# Patient Record
Sex: FEMALE
[De-identification: names, ages, dates, MRNs, and addresses within clinical notes are randomized per-mention and may not be internally consistent; named-entity substitution may affect disease eponyms.]

---

## 2018-09-24 ENCOUNTER — RESULT REVIEW (OUTPATIENT)
Age: 58
End: 2018-09-24

## 2019-09-16 ENCOUNTER — RESULT REVIEW (OUTPATIENT)
Age: 59
End: 2019-09-16

## 2020-09-18 ENCOUNTER — RESULT REVIEW (OUTPATIENT)
Age: 60
End: 2020-09-18

## 2020-10-07 ENCOUNTER — APPOINTMENT (OUTPATIENT)
Dept: UROLOGY | Facility: CLINIC | Age: 60
End: 2020-10-07
Payer: COMMERCIAL

## 2020-10-07 VITALS
TEMPERATURE: 97.5 F | WEIGHT: 132 LBS | HEIGHT: 64 IN | HEART RATE: 94 BPM | DIASTOLIC BLOOD PRESSURE: 85 MMHG | BODY MASS INDEX: 22.53 KG/M2 | SYSTOLIC BLOOD PRESSURE: 129 MMHG

## 2020-10-07 DIAGNOSIS — Z00.00 ENCOUNTER FOR GENERAL ADULT MEDICAL EXAMINATION W/OUT ABNORMAL FINDINGS: ICD-10-CM

## 2020-10-07 PROCEDURE — 51798 US URINE CAPACITY MEASURE: CPT

## 2020-10-07 PROCEDURE — 99204 OFFICE O/P NEW MOD 45 MIN: CPT | Mod: 25

## 2020-10-07 PROCEDURE — 81003 URINALYSIS AUTO W/O SCOPE: CPT | Mod: QW

## 2020-10-07 NOTE — HISTORY OF PRESENT ILLNESS
[FreeTextEntry1] : 60 year old  female with history of microscopic hematuria.  She has been sent her by her gyn for evaluation.  \par \par Ms. Mills also has intermittent right epigastric pain for the last year.  Underwent a CT abd/pelvis in 2019 which was unremarkable from a  perspective.  A small pancreatic cyst was noted.  She underwent a upper endoscopy and colonoscopy both of which were negative.  The etiology of her abdominal pain is still unclear.  \par \par Pt also reports intermittent urinary frequency and urgency as well as nocturia x3.   Denies dysuria. \par \par PVR min \par \par PMH: no\par PSH: breast lumpectomy, breast lift, umb hernia\par SH:  no tob, no EtOH\par FH:  daughter with stones,  malignancy-CaP in cousins. \par

## 2020-10-07 NOTE — PHYSICAL EXAM

## 2020-10-07 NOTE — LETTER BODY
[Dear  ___] : Dear  [unfilled], [Consult Letter:] : I had the pleasure of evaluating your patient, [unfilled]. [Please see my note below.] : Please see my note below. [Consult Closing:] : Thank you very much for allowing me to participate in the care of this patient.  If you have any questions, please do not hesitate to contact me. [Sincerely,] : Sincerely, [FreeTextEntry3] : Dr. Lana Piper\par

## 2020-10-07 NOTE — ASSESSMENT
[FreeTextEntry1] : Pt is a 60 year old female with microscopic hematuria.  I have sent today's urine for culture and cytology and she will undergo a CT scan and cystoscopy.

## 2020-10-12 LAB
BACTERIA UR CULT: NORMAL
BILIRUB UR QL STRIP: NORMAL
CLARITY UR: CLEAR
COLLECTION METHOD: NORMAL
GLUCOSE UR-MCNC: NORMAL
HCG UR QL: 0.2 EU/DL
KETONES UR-MCNC: NORMAL
LEUKOCYTE ESTERASE UR QL STRIP: NORMAL
NITRITE UR QL STRIP: NORMAL
PH UR STRIP: NORMAL
PROT UR STRIP-MCNC: 6
SP GR UR STRIP: >=1.03
URINE CYTOLOGY: NORMAL

## 2020-10-21 ENCOUNTER — APPOINTMENT (OUTPATIENT)
Dept: UROLOGY | Facility: CLINIC | Age: 60
End: 2020-10-21
Payer: COMMERCIAL

## 2020-10-21 VITALS
OXYGEN SATURATION: 100 % | TEMPERATURE: 97.3 F | HEART RATE: 91 BPM | SYSTOLIC BLOOD PRESSURE: 135 MMHG | DIASTOLIC BLOOD PRESSURE: 89 MMHG

## 2020-10-21 LAB
BILIRUB UR QL STRIP: NORMAL
CLARITY UR: CLEAR
COLLECTION METHOD: NORMAL
GLUCOSE UR-MCNC: NORMAL
HCG UR QL: 0.2 EU/DL
HGB UR QL STRIP.AUTO: NORMAL
KETONES UR-MCNC: NORMAL
LEUKOCYTE ESTERASE UR QL STRIP: NORMAL
NITRITE UR QL STRIP: NORMAL
PH UR STRIP: 7
PROT UR STRIP-MCNC: NORMAL
SP GR UR STRIP: 1.02

## 2020-10-21 PROCEDURE — 52000 CYSTOURETHROSCOPY: CPT

## 2020-10-21 PROCEDURE — 81003 URINALYSIS AUTO W/O SCOPE: CPT | Mod: QW

## 2020-10-21 PROCEDURE — 99072 ADDL SUPL MATRL&STAF TM PHE: CPT

## 2020-10-21 PROCEDURE — 99214 OFFICE O/P EST MOD 30 MIN: CPT | Mod: 25

## 2020-10-21 NOTE — ASSESSMENT
[FreeTextEntry1] : Pt is a 60 year old female with microscopic hematuria. Patient underwent cystoscopy today which was normal and she tolerated the procedure well.  We discussed the results of her CT which demonstrated small bilateral renal cysts. Pt will follow up in 1 year for repeat urine testing.

## 2020-10-21 NOTE — HISTORY OF PRESENT ILLNESS
[FreeTextEntry1] : 60 year old  female with history of microscopic hematuria.  She has been sent her by her gyn for evaluation.  She presents today for cystoscopy. \par \par Ms. Mills also has intermittent right epigastric pain for the last year.  Underwent a CT abd/pelvis in 2019 which was unremarkable from a  perspective.  A small pancreatic cyst was noted.  She underwent a upper endoscopy and colonoscopy both of which were negative.  The etiology of her abdominal pain is still unclear. Recent CT 10/14/20 demonstrated stable simple renal cysts. \par \par Pt also reports intermittent urinary frequency and urgency as well as nocturia x3.   Denies dysuria. \par \par Urine cytology and urine culture negative 10/07/2020. \par \par PMH: no\par PSH: breast lumpectomy, breast lift, umb hernia\par SH:  no tob, no EtOH\par FH:  daughter with stones,  malignancy-CaP in cousins. \par 
Martin Chen

## 2020-10-21 NOTE — PHYSICAL EXAM

## 2020-10-26 LAB — BACTERIA UR CULT: NORMAL

## 2021-09-21 ENCOUNTER — RESULT REVIEW (OUTPATIENT)
Age: 61
End: 2021-09-21

## 2022-09-22 ENCOUNTER — RESULT REVIEW (OUTPATIENT)
Age: 62
End: 2022-09-22

## 2023-07-12 ENCOUNTER — APPOINTMENT (OUTPATIENT)
Dept: UROLOGY | Facility: CLINIC | Age: 63
End: 2023-07-12
Payer: COMMERCIAL

## 2023-07-12 VITALS
OXYGEN SATURATION: 99 % | SYSTOLIC BLOOD PRESSURE: 145 MMHG | HEART RATE: 91 BPM | DIASTOLIC BLOOD PRESSURE: 80 MMHG | TEMPERATURE: 98 F

## 2023-07-12 DIAGNOSIS — R31.29 OTHER MICROSCOPIC HEMATURIA: ICD-10-CM

## 2023-07-12 DIAGNOSIS — R82.90 UNSPECIFIED ABNORMAL FINDINGS IN URINE: ICD-10-CM

## 2023-07-12 PROCEDURE — 81003 URINALYSIS AUTO W/O SCOPE: CPT | Mod: QW

## 2023-07-12 PROCEDURE — 99214 OFFICE O/P EST MOD 30 MIN: CPT

## 2023-07-13 NOTE — ASSESSMENT
[FreeTextEntry1] : I discussed the findings and options with Ms. YING SEUGRA in detail.\par \par We discussed the etiology and implications of recurrent asymptomatic microhematuria in great detail. Ms. SEGURA understands that the standard evaluation for microscopic hematuria includes a CT Scan of the abdomen and pelvis (+/-) followed by an in-office cystoscopy. Both procedures were discussed in great detail with their risks and benefits. \par \par A renal ultrasound was ordered today in the setting of hx microscopic hematuria.\par \par Urine was sent for analysis. \par \par She will call the office on Monday to discuss urine results.  If she has >3RBCs per hpf she will return for a cystoscopy prior to her relocation to [place]. \par \par Patient expressed understanding. \par \par

## 2023-07-13 NOTE — HISTORY OF PRESENT ILLNESS
[FreeTextEntry1] : Ms. YING SEGURA comes in today for her Urologic follow up. Pt is a 61 yo F with hx of microscopic hematuria. She presents today for a f/u visit.   She has no new complaints.  She has long-standing right lower abdominal pain of unclear etiology- all work-up remains negative.  The symptoms appear to be GI related-"improves with eating". \par \par She will be retiring end of August--plans on moving to [place]\par \par Udip: 2023 ---mod blood, trace leuks\par \par \par 10/21/2020--- 60 year old  female with history of microscopic hematuria.  She has been sent her by her gyn for evaluation.  She presents today for cystoscopy. \par \par Ms. Segura also has intermittent right epigastric pain for the last year.  Underwent a CT abd/pelvis in 2019 which was unremarkable from a  perspective.  A small pancreatic cyst was noted.  She underwent a upper endoscopy and colonoscopy both of which were negative.  The etiology of her abdominal pain is still unclear. Recent CT 10/14/20 demonstrated stable simple renal cysts. \par \par Pt also reports intermittent urinary frequency and urgency as well as nocturia x3.   Denies dysuria. \par \par Urine cytology and urine culture negative 10/07/2020. \par \par PMH: no\par PSH: breast lumpectomy, breast lift, umb hernia\par SH:  no tob, no EtOH\par FH:  daughter with stones,  malignancy-CaP in cousins. \par

## 2023-07-13 NOTE — ADDENDUM
[FreeTextEntry1] : A portion of this note was written by [Eliot Soares] on 07/12/2023 acting as a scribe for Dr. Piper. \par \par I have personally reviewed the chart and agree that the record accurately reflects my personal performance of the history, physical exam, assessment, and plan.\par

## 2023-07-14 LAB
APPEARANCE: CLEAR
BACTERIA UR CULT: NORMAL
BACTERIA: NEGATIVE /HPF
BILIRUBIN URINE: NEGATIVE
BLOOD URINE: ABNORMAL
CAST: 0 /LPF
COLOR: YELLOW
EPITHELIAL CELLS: 0 /HPF
GLUCOSE QUALITATIVE U: NEGATIVE MG/DL
KETONES URINE: NEGATIVE MG/DL
LEUKOCYTE ESTERASE URINE: ABNORMAL
MICROSCOPIC-UA: NORMAL
NITRITE URINE: NEGATIVE
PH URINE: 7
PROTEIN URINE: NORMAL MG/DL
RED BLOOD CELLS URINE: 11 /HPF
SPECIFIC GRAVITY URINE: 1.02
UROBILINOGEN URINE: 1 MG/DL
WHITE BLOOD CELLS URINE: 3 /HPF

## 2023-08-03 ENCOUNTER — APPOINTMENT (OUTPATIENT)
Dept: UROLOGY | Facility: CLINIC | Age: 63
End: 2023-08-03
Payer: COMMERCIAL

## 2023-08-03 VITALS
DIASTOLIC BLOOD PRESSURE: 83 MMHG | HEART RATE: 85 BPM | OXYGEN SATURATION: 100 % | TEMPERATURE: 97.6 F | SYSTOLIC BLOOD PRESSURE: 145 MMHG

## 2023-08-03 DIAGNOSIS — N32.89 OTHER SPECIFIED DISORDERS OF BLADDER: ICD-10-CM

## 2023-08-03 LAB
BILIRUB UR QL STRIP: NEGATIVE
CLARITY UR: CLEAR
COLLECTION METHOD: NORMAL
GLUCOSE UR-MCNC: NEGATIVE
HCG UR QL: 0.2 EU/DL
HGB UR QL STRIP.AUTO: NORMAL
KETONES UR-MCNC: NEGATIVE
LEUKOCYTE ESTERASE UR QL STRIP: NEGATIVE
NITRITE UR QL STRIP: NEGATIVE
PH UR STRIP: 7
PROT UR STRIP-MCNC: NEGATIVE
SP GR UR STRIP: 1.02

## 2023-08-03 PROCEDURE — 52000 CYSTOURETHROSCOPY: CPT

## 2023-08-03 PROCEDURE — 81002 URINALYSIS NONAUTO W/O SCOPE: CPT

## 2023-08-03 RX ORDER — METHENAMINE, SODIUM PHOSPHATE, MONOBASIC, MONOHYDRATE, PHENYL SALICYLATE, METHYLENE BLUE, AND HYOSCYAMINE SULFATE 118; 40.8; 36; 10; .12 MG/1; MG/1; MG/1; MG/1; MG/1
118 CAPSULE ORAL 4 TIMES DAILY
Qty: 16 | Refills: 6 | Status: ACTIVE | COMMUNITY
Start: 2023-08-03 | End: 1900-01-01

## 2023-08-03 NOTE — PHYSICAL EXAM

## 2023-08-03 NOTE — ASSESSMENT
[FreeTextEntry1] : I discussed the findings and options with Ms. YING SEGURA in detail.  Ms. YING SEGURA comes in today for her cystoscopy procedure as discussed during last visit.  Fortunately, today's cystoscopy was negative for any significant lower urinary tract pathology. This also completes her microscopic hematuria evaluation.  We discussed her Renal US from 7/27/23 which were unremarkable--no evidence of renal stones, no hydronephrosis. It showed a simple bilateral renal cyst (benign)--findings suggest no further workup.  Today she c/o occasional bladder spasms. She will take Uribel to suppress her intermittent symptoms. I have sent Rx to her pharmacy.   Patient expressed understanding.

## 2023-08-03 NOTE — HISTORY OF PRESENT ILLNESS
[FreeTextEntry1] : Ms. YING SEGURA comes in today for her Urologic follow up. Including a scheduled cystoscopy to evaluate her history of microscopic hematuria.   23--- Ms. YING SEGURA comes in today for her Urologic follow up. Pt is a 63 yo F with hx of microscopic hematuria. She presents today for a f/u visit.   She has no new complaints.  She has long-standing right lower abdominal pain of unclear etiology- all work-up remains negative.  The symptoms appear to be GI related-"improves with eating".   She will be retiring end of August--plans on moving to [place]  Udip: 2023 ---mod blood, trace leuks   10/21/2020--- 60 year old  female with history of microscopic hematuria.  She has been sent her by her gyn for evaluation.  She presents today for cystoscopy.   Ms. Segura also has intermittent right epigastric pain for the last year.  Underwent a CT abd/pelvis in 2019 which was unremarkable from a  perspective.  A small pancreatic cyst was noted.  She underwent a upper endoscopy and colonoscopy both of which were negative.  The etiology of her abdominal pain is still unclear. Recent CT 10/14/20 demonstrated stable simple renal cysts.   Pt also reports intermittent urinary frequency and urgency as well as nocturia x3.   Denies dysuria.   Urine cytology and urine culture negative 10/07/2020.   PMH: no PSH: breast lumpectomy, breast lift, umb hernia SH:  no tob, no EtOH FH:  daughter with stones,  malignancy-CaP in cousins.

## 2023-08-03 NOTE — ADDENDUM
Problem: Patient/Family Goals  Goal: Patient/Family Long Term Goal  Description: Patient's Long Term Goal: discharge    Interventions:  - surgery  - See additional Care Plan goals for specific interventions  Outcome: Progressing  Goal: Patient/Family Mili [FreeTextEntry1] : A portion of this note was written by [Eliot Soares] on 07/12/2023 acting as a scribe for Dr. Piper. \par  \par  I have personally reviewed the chart and agree that the record accurately reflects my personal performance of the history, physical exam, assessment, and plan.\par   integrity  - Assess and document dressing/incision, wound bed, drain sites and surrounding tissue  - Implement wound care per orders  - Initiate isolation precautions as appropriate  - Initiate Pressure Ulcer prevention bundle as indicated  Outcome: Dorian

## 2023-08-07 LAB — BACTERIA UR CULT: NORMAL
